# Patient Record
Sex: FEMALE | Race: WHITE | HISPANIC OR LATINO | URBAN - METROPOLITAN AREA
[De-identification: names, ages, dates, MRNs, and addresses within clinical notes are randomized per-mention and may not be internally consistent; named-entity substitution may affect disease eponyms.]

---

## 2022-05-13 ENCOUNTER — APPOINTMENT (RX ONLY)
Dept: URBAN - METROPOLITAN AREA CLINIC 328 | Facility: CLINIC | Age: 42
Setting detail: DERMATOLOGY
End: 2022-05-13

## 2022-05-13 DIAGNOSIS — L20.89 OTHER ATOPIC DERMATITIS: ICD-10-CM

## 2022-05-13 PROBLEM — L20.84 INTRINSIC (ALLERGIC) ECZEMA: Status: ACTIVE | Noted: 2022-05-13

## 2022-05-13 PROCEDURE — 99203 OFFICE O/P NEW LOW 30 MIN: CPT

## 2022-05-13 PROCEDURE — ? COUNSELING

## 2022-05-13 PROCEDURE — ? PRESCRIPTION

## 2022-05-13 RX ORDER — CLOBETASOL PROPIONATE 0.5 MG/G
CREAM TOPICAL TIW
Qty: 30 | Refills: 3 | Status: ERX | COMMUNITY
Start: 2022-05-13

## 2022-05-13 RX ORDER — PREDNISONE 20 MG/1
TABLET ORAL
Qty: 4 | Refills: 0 | Status: ERX | COMMUNITY
Start: 2022-05-13

## 2022-05-13 RX ADMIN — PREDNISONE: 20 TABLET ORAL at 00:00

## 2022-05-13 RX ADMIN — CLOBETASOL PROPIONATE: 0.5 CREAM TOPICAL at 00:00

## 2022-05-13 ASSESSMENT — LOCATION ZONE DERM: LOCATION ZONE: HAND

## 2022-05-13 ASSESSMENT — LOCATION SIMPLE DESCRIPTION DERM: LOCATION SIMPLE: LEFT HAND

## 2022-05-13 ASSESSMENT — LOCATION DETAILED DESCRIPTION DERM: LOCATION DETAILED: LEFT RADIAL DORSAL HAND

## 2022-05-13 NOTE — PROCEDURE: COUNSELING
Antihistamine Recommendations: Claritin
Detail Level: Zone
Cleanser Recommendations: Cetaphil/CeraVe soap and cream- wash the body with the bar soap and moisturize with the cream at least twice a day to keep the body moisturized.  When there is no rash, continue with the Cetaphil everyday to maintain moisture.
Bleach Bath Recommendations: Bleach Baths- Fill the bathtub with water and 1 capful of bleach into water. Mix water and sit in bathtub for 5 mins, then rinse. This can be done twice a week.